# Patient Record
Sex: FEMALE | Race: BLACK OR AFRICAN AMERICAN | URBAN - METROPOLITAN AREA
[De-identification: names, ages, dates, MRNs, and addresses within clinical notes are randomized per-mention and may not be internally consistent; named-entity substitution may affect disease eponyms.]

---

## 2017-10-30 ENCOUNTER — ED HISTORICAL/CONVERTED ENCOUNTER (OUTPATIENT)
Dept: OTHER | Age: 42
End: 2017-10-30

## 2020-03-11 ENCOUNTER — OP HISTORICAL/CONVERTED ENCOUNTER (OUTPATIENT)
Dept: OTHER | Age: 45
End: 2020-03-11

## 2022-12-27 ENCOUNTER — HOSPITAL ENCOUNTER (INPATIENT)
Age: 47
LOS: 1 days | Discharge: HOME | DRG: 760 | End: 2022-12-29
Attending: STUDENT IN AN ORGANIZED HEALTH CARE EDUCATION/TRAINING PROGRAM | Admitting: INTERNAL MEDICINE

## 2022-12-27 DIAGNOSIS — D50.0 IRON DEFICIENCY ANEMIA DUE TO CHRONIC BLOOD LOSS: Primary | ICD-10-CM

## 2022-12-27 PROCEDURE — 36415 COLL VENOUS BLD VENIPUNCTURE: CPT

## 2022-12-27 PROCEDURE — 80053 COMPREHEN METABOLIC PANEL: CPT

## 2022-12-27 PROCEDURE — 99285 EMERGENCY DEPT VISIT HI MDM: CPT

## 2022-12-27 PROCEDURE — 85025 COMPLETE CBC W/AUTO DIFF WBC: CPT

## 2022-12-27 PROCEDURE — 86923 COMPATIBILITY TEST ELECTRIC: CPT

## 2022-12-27 PROCEDURE — 86900 BLOOD TYPING SEROLOGIC ABO: CPT

## 2022-12-28 ENCOUNTER — APPOINTMENT (OUTPATIENT)
Dept: ULTRASOUND IMAGING | Age: 47
DRG: 760 | End: 2022-12-28
Attending: INTERNAL MEDICINE

## 2022-12-28 PROBLEM — D64.9 ANEMIA: Status: ACTIVE | Noted: 2022-12-28

## 2022-12-28 LAB
ALBUMIN SERPL-MCNC: 3.5 G/DL (ref 3.5–5)
ALBUMIN/GLOB SERPL: 1.2 {RATIO} (ref 1.1–2.2)
ALP SERPL-CCNC: 46 U/L (ref 45–117)
ALT SERPL-CCNC: 13 U/L (ref 12–78)
ANION GAP SERPL CALC-SCNC: 11 MMOL/L (ref 5–15)
APPEARANCE UR: CLEAR
AST SERPL W P-5'-P-CCNC: 17 U/L (ref 15–37)
ATRIAL RATE: 94 BPM
BACTERIA URNS QL MICRO: NEGATIVE /HPF
BASOPHILS # BLD: 0.1 K/UL (ref 0–0.1)
BASOPHILS NFR BLD: 0 % (ref 0–1)
BILIRUB SERPL-MCNC: 1.3 MG/DL (ref 0.2–1)
BILIRUB UR QL: NEGATIVE
BUN SERPL-MCNC: 5 MG/DL (ref 6–20)
BUN/CREAT SERPL: 6 (ref 12–20)
CA-I BLD-MCNC: 8.4 MG/DL (ref 8.5–10.1)
CALCULATED P AXIS, ECG09: 41 DEGREES
CALCULATED R AXIS, ECG10: 38 DEGREES
CALCULATED T AXIS, ECG11: 49 DEGREES
CHLORIDE SERPL-SCNC: 108 MMOL/L (ref 97–108)
CO2 SERPL-SCNC: 21 MMOL/L (ref 21–32)
COLOR UR: ABNORMAL
CREAT SERPL-MCNC: 0.89 MG/DL (ref 0.55–1.02)
DIAGNOSIS, 93000: NORMAL
DIFFERENTIAL METHOD BLD: ABNORMAL
EOSINOPHIL # BLD: 0.1 K/UL (ref 0–0.4)
EOSINOPHIL NFR BLD: 0 % (ref 0–7)
ERYTHROCYTE [DISTWIDTH] IN BLOOD BY AUTOMATED COUNT: 44 % (ref 11.5–14.5)
GLOBULIN SER CALC-MCNC: 2.9 G/DL (ref 2–4)
GLUCOSE SERPL-MCNC: 115 MG/DL (ref 65–100)
GLUCOSE UR STRIP.AUTO-MCNC: NEGATIVE MG/DL
HCT VFR BLD AUTO: 11.5 % (ref 35–47)
HCT VFR BLD AUTO: 19.4 % (ref 35–47)
HCT VFR BLD AUTO: 28.4 % (ref 35–47)
HGB BLD-MCNC: 2.9 G/DL (ref 11.5–16)
HGB BLD-MCNC: 5.7 G/DL (ref 11.5–16)
HGB BLD-MCNC: 9.3 G/DL (ref 11.5–16)
HGB UR QL STRIP: ABNORMAL
IMM GRANULOCYTES # BLD AUTO: 0.3 K/UL (ref 0–0.04)
IMM GRANULOCYTES NFR BLD AUTO: 2 % (ref 0–0.5)
INR PPP: 1 (ref 0.9–1.1)
KETONES UR QL STRIP.AUTO: NEGATIVE MG/DL
LEUKOCYTE ESTERASE UR QL STRIP.AUTO: NEGATIVE
LYMPHOCYTES # BLD: 2.3 K/UL (ref 0.8–3.5)
LYMPHOCYTES NFR BLD: 15 % (ref 12–49)
MCH RBC QN AUTO: 20.9 PG (ref 26–34)
MCHC RBC AUTO-ENTMCNC: 25.2 G/DL (ref 30–36.5)
MCV RBC AUTO: 82.7 FL (ref 80–99)
MONOCYTES # BLD: 0.9 K/UL (ref 0–1)
MONOCYTES NFR BLD: 6 % (ref 5–13)
NEUTS SEG # BLD: 12.1 K/UL (ref 1.8–8)
NEUTS SEG NFR BLD: 77 % (ref 32–75)
NITRITE UR QL STRIP.AUTO: NEGATIVE
NRBC # BLD: 0.39 K/UL (ref 0–0.01)
NRBC BLD-RTO: 2.5 PER 100 WBC
P-R INTERVAL, ECG05: 160 MS
PH UR STRIP: 6 [PH] (ref 5–8)
PLATELET # BLD AUTO: 169 K/UL (ref 150–400)
POTASSIUM SERPL-SCNC: 3.6 MMOL/L (ref 3.5–5.1)
PROT SERPL-MCNC: 6.4 G/DL (ref 6.4–8.2)
PROT UR STRIP-MCNC: NEGATIVE MG/DL
PROTHROMBIN TIME: 13.6 SEC (ref 11.9–14.6)
Q-T INTERVAL, ECG07: 366 MS
QRS DURATION, ECG06: 76 MS
QTC CALCULATION (BEZET), ECG08: 457 MS
RBC # BLD AUTO: 1.39 M/UL (ref 3.8–5.2)
RBC #/AREA URNS HPF: ABNORMAL /HPF (ref 0–5)
SODIUM SERPL-SCNC: 140 MMOL/L (ref 136–145)
SP GR UR REFRACTOMETRY: 1.01 (ref 1–1.03)
UA: UC IF INDICATED,UAUC: ABNORMAL
UROBILINOGEN UR QL STRIP.AUTO: 0.1 EU/DL (ref 0.1–1)
VENTRICULAR RATE, ECG03: 94 BPM
WBC # BLD AUTO: 15.6 K/UL (ref 3.6–11)
WBC URNS QL MICRO: ABNORMAL /HPF (ref 0–4)

## 2022-12-28 PROCEDURE — 74011250636 HC RX REV CODE- 250/636: Performed by: INTERNAL MEDICINE

## 2022-12-28 PROCEDURE — 93005 ELECTROCARDIOGRAM TRACING: CPT

## 2022-12-28 PROCEDURE — 74011000250 HC RX REV CODE- 250: Performed by: INTERNAL MEDICINE

## 2022-12-28 PROCEDURE — 81001 URINALYSIS AUTO W/SCOPE: CPT

## 2022-12-28 PROCEDURE — 85025 COMPLETE CBC W/AUTO DIFF WBC: CPT

## 2022-12-28 PROCEDURE — 36415 COLL VENOUS BLD VENIPUNCTURE: CPT

## 2022-12-28 PROCEDURE — 85018 HEMOGLOBIN: CPT

## 2022-12-28 PROCEDURE — 36430 TRANSFUSION BLD/BLD COMPNT: CPT

## 2022-12-28 PROCEDURE — 76830 TRANSVAGINAL US NON-OB: CPT

## 2022-12-28 PROCEDURE — P9016 RBC LEUKOCYTES REDUCED: HCPCS

## 2022-12-28 PROCEDURE — 85610 PROTHROMBIN TIME: CPT

## 2022-12-28 PROCEDURE — 30233N1 TRANSFUSION OF NONAUTOLOGOUS RED BLOOD CELLS INTO PERIPHERAL VEIN, PERCUTANEOUS APPROACH: ICD-10-PCS | Performed by: INTERNAL MEDICINE

## 2022-12-28 PROCEDURE — 74011250637 HC RX REV CODE- 250/637: Performed by: STUDENT IN AN ORGANIZED HEALTH CARE EDUCATION/TRAINING PROGRAM

## 2022-12-28 PROCEDURE — 65270000029 HC RM PRIVATE

## 2022-12-28 RX ORDER — POLYETHYLENE GLYCOL 3350 17 G/17G
17 POWDER, FOR SOLUTION ORAL DAILY PRN
Status: DISCONTINUED | OUTPATIENT
Start: 2022-12-28 | End: 2022-12-29 | Stop reason: HOSPADM

## 2022-12-28 RX ORDER — ONDANSETRON 2 MG/ML
4 INJECTION INTRAMUSCULAR; INTRAVENOUS
Status: DISCONTINUED | OUTPATIENT
Start: 2022-12-28 | End: 2022-12-29 | Stop reason: HOSPADM

## 2022-12-28 RX ORDER — ACETAMINOPHEN 325 MG/1
650 TABLET ORAL
Status: DISCONTINUED | OUTPATIENT
Start: 2022-12-28 | End: 2022-12-29 | Stop reason: HOSPADM

## 2022-12-28 RX ORDER — ACETAMINOPHEN 325 MG/1
650 TABLET ORAL ONCE
Status: COMPLETED | OUTPATIENT
Start: 2022-12-28 | End: 2022-12-28

## 2022-12-28 RX ORDER — SODIUM CHLORIDE 0.9 % (FLUSH) 0.9 %
5-40 SYRINGE (ML) INJECTION EVERY 8 HOURS
Status: DISCONTINUED | OUTPATIENT
Start: 2022-12-28 | End: 2022-12-29 | Stop reason: HOSPADM

## 2022-12-28 RX ORDER — SODIUM CHLORIDE 9 MG/ML
250 INJECTION, SOLUTION INTRAVENOUS AS NEEDED
Status: DISCONTINUED | OUTPATIENT
Start: 2022-12-28 | End: 2022-12-29 | Stop reason: HOSPADM

## 2022-12-28 RX ORDER — ACETAMINOPHEN 650 MG/1
650 SUPPOSITORY RECTAL
Status: DISCONTINUED | OUTPATIENT
Start: 2022-12-28 | End: 2022-12-29 | Stop reason: HOSPADM

## 2022-12-28 RX ORDER — FUROSEMIDE 10 MG/ML
60 INJECTION INTRAMUSCULAR; INTRAVENOUS ONCE
Status: COMPLETED | OUTPATIENT
Start: 2022-12-28 | End: 2022-12-28

## 2022-12-28 RX ORDER — ONDANSETRON 4 MG/1
4 TABLET, ORALLY DISINTEGRATING ORAL
Status: DISCONTINUED | OUTPATIENT
Start: 2022-12-28 | End: 2022-12-29 | Stop reason: HOSPADM

## 2022-12-28 RX ORDER — SODIUM CHLORIDE 0.9 % (FLUSH) 0.9 %
5-40 SYRINGE (ML) INJECTION AS NEEDED
Status: DISCONTINUED | OUTPATIENT
Start: 2022-12-28 | End: 2022-12-29 | Stop reason: HOSPADM

## 2022-12-28 RX ADMIN — FUROSEMIDE 60 MG: 10 INJECTION, SOLUTION INTRAMUSCULAR; INTRAVENOUS at 15:39

## 2022-12-28 RX ADMIN — ACETAMINOPHEN 650 MG: 325 TABLET ORAL at 02:11

## 2022-12-28 RX ADMIN — SODIUM CHLORIDE, PRESERVATIVE FREE 10 ML: 5 INJECTION INTRAVENOUS at 21:41

## 2022-12-28 NOTE — PROGRESS NOTES
Problem: Falls - Risk of  Goal: *Absence of Falls  Description: Document Allyssa Rubione Fall Risk and appropriate interventions in the flowsheet.   Outcome: Progressing Towards Goal  Note: Fall Risk Interventions:                                Problem: Patient Education: Go to Patient Education Activity  Goal: Patient/Family Education  Outcome: Progressing Towards Goal

## 2022-12-28 NOTE — H&P
History & Physical    Primary Care Provider: Other, MD Wilmar  Source of Information: Patient self    History of Presenting Illness: Kalie Wang is a 49 y.o. female who presents with complaints of weakness and dizziness for over a week. Notes heavy menstrual bleed for over 1 week. Denies chest pain or shortness of breath. Has not follow-up with OB/GYN since personal OB/GYN left the practice. No abdominal pain nausea or vomiting. Evaluation in the ED showed a hemoglobin of 2.9 with a hematocrit of 11.5. She has received 2 units of packed red blood cells with improvement in hemoglobin to 5.7. Will be admitted for additional 2 units of packed red blood cells. Review of Systems:  A comprehensive review of systems was negative except for that written in the History of Present Illness. Past Medical History:   Diagnosis Date    Hypertension       History reviewed. No pertinent surgical history. Prior to Admission medications    Not on File     No Known Allergies   History reviewed. No pertinent family history. SOCIAL HISTORY:  Patient resides:  Independently    Assisted Living    SNF    With family care x      Smoking history:   None x   Former    Chronic      Alcohol history:   None x   Social    Chronic      Ambulates:   Independently x   w/cane    w/walker    w/wc    CODE STATUS:  DNR    Full x   Other      Objective:     Physical Exam:     Visit Vitals  BP (!) 153/96   Pulse 87   Temp 98.1 °F (36.7 °C)   Resp 17   Ht 5' 4\" (1.626 m)   Wt 73.5 kg (162 lb)   SpO2 100%   BMI 27.81 kg/m²      O2 Device: None (Room air)    General:  Alert, cooperative, no distress, appears stated age. Head:  Normocephalic, without obvious abnormality, atraumatic. Eyes:  Conjunctivae/corneas clear. PERRL, EOMs intact. Nose: Nares normal. Septum midline. Mucosa normal. No drainage or sinus tenderness.    Throat: Lips, mucosa, and tongue normal. Teeth and gums normal.   Neck: Supple, symmetrical, trachea midline, no adenopathy, thyroid: no enlargement/tenderness/nodules, no carotid bruit and no JVD. Back:   Symmetric, no curvature. ROM normal. No CVA tenderness. Lungs:   Clear to auscultation bilaterally. Chest wall:  No tenderness or deformity. Heart:  Regular rate and rhythm, S1, S2 normal, no murmur, click, rub or gallop. Abdomen:   Soft, non-tender. Bowel sounds normal. No masses,  No organomegaly. Extremities: Extremities normal, atraumatic, no cyanosis or edema. Pulses: 2+ and symmetric all extremities. Skin: Skin color, texture, turgor normal. No rashes or lesions   Neurologic: CNII-XII intact. No motor or sensory deficits. EKG:  nonspecific ST and T waves changes. Data Review:     Recent Days:  Recent Labs     12/28/22  0826 12/28/22  0032   WBC  --  15.6*   HGB 5.7* 2.9*   HCT 19.4* 11.5*   PLT  --  169     Recent Labs     12/28/22  0630 12/27/22  2325   NA  --  140   K  --  3.6   CL  --  108   CO2  --  21   GLU  --  115*   BUN  --  5*   CREA  --  0.89   CA  --  8.4*   ALB  --  3.5   TBILI  --  1.3*   ALT  --  13   INR 1.0  --      No results for input(s): PH, PCO2, PO2, HCO3, FIO2 in the last 72 hours. 24 Hour Results:  Recent Results (from the past 24 hour(s))   METABOLIC PANEL, COMPREHENSIVE    Collection Time: 12/27/22 11:25 PM   Result Value Ref Range    Sodium 140 136 - 145 mmol/L    Potassium 3.6 3.5 - 5.1 mmol/L    Chloride 108 97 - 108 mmol/L    CO2 21 21 - 32 mmol/L    Anion gap 11 5 - 15 mmol/L    Glucose 115 (H) 65 - 100 mg/dL    BUN 5 (L) 6 - 20 mg/dL    Creatinine 0.89 0.55 - 1.02 mg/dL    BUN/Creatinine ratio 6 (L) 12 - 20      eGFR >60 >60 ml/min/1.73m2    Calcium 8.4 (L) 8.5 - 10.1 mg/dL    Bilirubin, total 1.3 (H) 0.2 - 1.0 mg/dL    AST (SGOT) 17 15 - 37 U/L    ALT (SGPT) 13 12 - 78 U/L    Alk.  phosphatase 46 45 - 117 U/L    Protein, total 6.4 6.4 - 8.2 g/dL    Albumin 3.5 3.5 - 5.0 g/dL    Globulin 2.9 2.0 - 4.0 g/dL    A-G Ratio 1.2 1.1 - 2.2     TYPE & SCREEN    Collection Time: 12/27/22 11:25 PM   Result Value Ref Range    Crossmatch Expiration 12/30/2022,2359     ABO/Rh(D) B Positive     Antibody screen Negative     Unit number [id number]     Blood component type RC LR     Unit division 00     Status of unit [address] to transfuse     Crossmatch result Compatible     Unit number [id number]     Blood component type RC LR     Unit division 00     Status of unit [address] to transfuse     Crossmatch result Compatible     Unit number [id number]     Blood component type RC LR     Unit division 00     Status of unit [address] to transfuse     Crossmatch result Compatible     Unit number [id number]     Blood component type RC LR     Unit division 00     Status of unit [place] to transfuse     Crossmatch result Compatible     Unit number [id number]     Blood component type RC LR     Unit division 00     Status of unit [address] to transfuse     Crossmatch result Compatible    CBC WITH AUTOMATED DIFF    Collection Time: 12/28/22 12:32 AM   Result Value Ref Range    WBC 15.6 (H) 3.6 - 11.0 K/uL    RBC 1.39 (L) 3.80 - 5.20 M/uL    HGB 2.9 (LL) 11.5 - 16.0 g/dL    HCT 11.5 (LL) 35.0 - 47.0 %    MCV 82.7 80.0 - 99.0 FL    MCH 20.9 (L) 26.0 - 34.0 PG    MCHC 25.2 (L) 30.0 - 36.5 g/dL    RDW 44.0 (H) 11.5 - 14.5 %    PLATELET 438 602 - 101 K/uL    NRBC 2.5 (H) 0.0  WBC    ABSOLUTE NRBC 0.39 (H) 0.00 - 0.01 K/uL    NEUTROPHILS 77 (H) 32 - 75 %    LYMPHOCYTES 15 12 - 49 %    MONOCYTES 6 5 - 13 %    EOSINOPHILS 0 0 - 7 %    BASOPHILS 0 0 - 1 %    IMMATURE GRANULOCYTES 2 (H) 0 - 0.5 %    ABS. NEUTROPHILS 12.1 (H) 1.8 - 8.0 K/UL    ABS. LYMPHOCYTES 2.3 0.8 - 3.5 K/UL    ABS. MONOCYTES 0.9 0.0 - 1.0 K/UL    ABS. EOSINOPHILS 0.1 0.0 - 0.4 K/UL    ABS. BASOPHILS 0.1 0.0 - 0.1 K/UL    ABS. IMM.  GRANS. 0.3 (H) 0.00 - 0.04 K/UL    DF AUTOMATED     URINALYSIS W/ REFLEX CULTURE    Collection Time: 12/28/22  5:01 AM    Specimen: Urine   Result Value Ref Range    Color Yellow/Straw      Appearance Clear Clear      Specific gravity 1.011 1.003 - 1.030      pH (UA) 6.0 5.0 - 8.0      Protein Negative Negative mg/dL    Glucose Negative Negative mg/dL    Ketone Negative Negative mg/dL    Bilirubin Negative Negative      Blood Moderate (A) Negative      Urobilinogen 0.1 0.1 - 1.0 EU/dL    Nitrites Negative Negative      Leukocyte Esterase Negative Negative      WBC 0-4 0 - 4 /hpf    RBC 10-20 0 - 5 /hpf    Bacteria Negative Negative /hpf    UA:UC IF INDICATED Culture not indicated by UA result Culture not indicated by UA result     PROTHROMBIN TIME + INR    Collection Time: 12/28/22  6:30 AM   Result Value Ref Range    Prothrombin time 13.6 11.9 - 14.6 sec    INR 1.0 0.9 - 1.1     HGB & HCT    Collection Time: 12/28/22  8:26 AM   Result Value Ref Range    HGB 5.7 (LL) 11.5 - 16.0 g/dL    HCT 19.4 (L) 35.0 - 47.0 %         Imaging:   No orders to display         Assessment:     Anemia of acute blood loss   -Secondary to menorrhagia   -Transfuse additional 2 units of packed red blood cells to keep hemoglobin above 8.0    -Obtain transvaginal ultrasound    -Consult OB/GYN for input    -Obtain iron levels        Plan:     Admit to medical telemetry floor inpatient  Treatment plan as above  Will give IV Lasix between blood transfusions to avoid fluid overload  Avoid anticoagulants due to anemia  GI prophylaxis  She is full code  Signed By: Guru Bee MD     December 28, 2022

## 2022-12-28 NOTE — ACP (ADVANCE CARE PLANNING)
Advance Care Planning   Healthcare Decision Maker:       Primary Decision Maker: Madonna Joshi [facility] [phone]

## 2022-12-28 NOTE — ED PROVIDER NOTES
EMERGENCY DEPARTMENT HISTORY AND PHYSICAL EXAM      Date: 12/27/2022  Patient Name: Lashay Baird    History of Presenting Illness     Chief Complaint   Patient presents with    Anemia       History Provided By: Patient    HPI: Lashay Baird, 49 y.o. female with a past medical history significant No significant past medical history presents to the ED with cc of generalized weakness, dizziness, concerned about heavy menstrual cycle. Patient has a history of anemia, states over the last week her menstrual cycle has been very heavy, states she soaked multiple pads a day, has been passing clots. Over the last 2 days noted that menstrual cycle has actually lightened a little bit however today was feeling generalized weakness dizziness no loss of consciousness no chest pain mild dyspnea on exertion. Denies any abdominal pain no pain or burning on urination. Patient has never had a transfusion in the past.    There are no other complaints, changes, or physical findings at this time. PCP: Monster, MD Wilmar        Past History     Past Medical History:  Past Medical History:   Diagnosis Date    Hypertension        Past Surgical History:  History reviewed. No pertinent surgical history. Family History:  History reviewed. No pertinent family history. Social History:  Social History     Tobacco Use    Smoking status: Never    Smokeless tobacco: Never       Allergies:  No Known Allergies      Review of Systems     Review of Systems   Constitutional:  Negative for activity change, chills, fatigue and fever. HENT:  Negative for congestion and trouble swallowing. Eyes:  Negative for photophobia and visual disturbance. Respiratory:  Negative for cough, chest tightness and shortness of breath. Cardiovascular:  Negative for chest pain and palpitations. Gastrointestinal:  Negative for abdominal distention, abdominal pain, diarrhea, nausea and vomiting.    Genitourinary:  Negative for dysuria, flank pain and frequency. Musculoskeletal:  Negative for arthralgias, back pain and myalgias. Skin:  Negative for color change, pallor and rash. Neurological:  Positive for dizziness, weakness and light-headedness. Negative for tremors and headaches. Psychiatric/Behavioral:  Negative for confusion. Physical Exam     Physical Exam  Vitals and nursing note reviewed. Constitutional:       General: She is not in acute distress. Appearance: Normal appearance. She is normal weight. She is not ill-appearing. HENT:      Head: Normocephalic and atraumatic. Nose: Nose normal.      Mouth/Throat:      Mouth: Mucous membranes are moist.   Eyes:      Extraocular Movements: Extraocular movements intact. Pupils: Pupils are equal, round, and reactive to light. Cardiovascular:      Rate and Rhythm: Regular rhythm. Tachycardia present. Pulses: Normal pulses. Pulmonary:      Effort: Pulmonary effort is normal.      Breath sounds: Normal breath sounds. Abdominal:      General: Abdomen is flat. Bowel sounds are normal.      Palpations: Abdomen is soft. Tenderness: There is no abdominal tenderness. There is no guarding. Musculoskeletal:         General: No tenderness. Normal range of motion. Cervical back: Normal range of motion and neck supple. No muscular tenderness. Skin:     General: Skin is warm and dry. Neurological:      General: No focal deficit present. Mental Status: She is alert and oriented to person, place, and time. Sensory: No sensory deficit. Motor: No weakness.        Diagnostic Study Results     Labs -     Recent Results (from the past 12 hour(s))   METABOLIC PANEL, COMPREHENSIVE    Collection Time: 12/27/22 11:25 PM   Result Value Ref Range    Sodium 140 136 - 145 mmol/L    Potassium 3.6 3.5 - 5.1 mmol/L    Chloride 108 97 - 108 mmol/L    CO2 21 21 - 32 mmol/L    Anion gap 11 5 - 15 mmol/L    Glucose 115 (H) 65 - 100 mg/dL    BUN 5 (L) 6 - 20 mg/dL    Creatinine 0. 89 0.55 - 1.02 mg/dL    BUN/Creatinine ratio 6 (L) 12 - 20      eGFR >60 >60 ml/min/1.73m2    Calcium 8.4 (L) 8.5 - 10.1 mg/dL    Bilirubin, total 1.3 (H) 0.2 - 1.0 mg/dL    AST (SGOT) 17 15 - 37 U/L    ALT (SGPT) 13 12 - 78 U/L    Alk. phosphatase 46 45 - 117 U/L    Protein, total 6.4 6.4 - 8.2 g/dL    Albumin 3.5 3.5 - 5.0 g/dL    Globulin 2.9 2.0 - 4.0 g/dL    A-G Ratio 1.2 1.1 - 2.2     TYPE & SCREEN    Collection Time: 12/27/22 11:25 PM   Result Value Ref Range    Crossmatch Expiration 12/30/2022,2359     ABO/Rh(D) B Positive     Antibody screen Negative     Unit number [id number]     Blood component type [facility]     Unit division 00     Status of unit [address] to transfuse     Crossmatch result Compatible     Unit number [id number]     Blood component type [facility]     Unit division 00     Status of unit Allocated     TRANSFUSION STATUS Ok to transfuse     Crossmatch result Compatible    CBC WITH AUTOMATED DIFF    Collection Time: 12/28/22 12:32 AM   Result Value Ref Range    WBC 15.6 (H) 3.6 - 11.0 K/uL    RBC 1.39 (L) 3.80 - 5.20 M/uL    HGB 2.9 (LL) 11.5 - 16.0 g/dL    HCT 11.5 (LL) 35.0 - 47.0 %    MCV 82.7 80.0 - 99.0 FL    MCH 20.9 (L) 26.0 - 34.0 PG    MCHC 25.2 (L) 30.0 - 36.5 g/dL    RDW 44.0 (H) 11.5 - 14.5 %    PLATELET 412 164 - 692 K/uL    NRBC 2.5 (H) 0.0  WBC    ABSOLUTE NRBC 0.39 (H) 0.00 - 0.01 K/uL    NEUTROPHILS 77 (H) 32 - 75 %    LYMPHOCYTES 15 12 - 49 %    MONOCYTES 6 5 - 13 %    EOSINOPHILS 0 0 - 7 %    BASOPHILS 0 0 - 1 %    IMMATURE GRANULOCYTES 2 (H) 0 - 0.5 %    ABS. NEUTROPHILS 12.1 (H) 1.8 - 8.0 K/UL    ABS. LYMPHOCYTES 2.3 0.8 - 3.5 K/UL    ABS. MONOCYTES 0.9 0.0 - 1.0 K/UL    ABS. EOSINOPHILS 0.1 0.0 - 0.4 K/UL    ABS. BASOPHILS 0.1 0.0 - 0.1 K/UL    ABS. IMM.  GRANS. 0.3 (H) 0.00 - 0.04 K/UL    DF AUTOMATED         Radiologic Studies -   [unfilled]  CT Results  (Last 48 hours)      None          CXR Results  (Last 48 hours)      None Medical Decision Making and ED Course   I am the first provider for this patient. I reviewed the vital signs, available nursing notes, past medical history, past surgical history, family history and social history. Vital Signs-Reviewed the patient's vital signs. Patient Vitals for the past 12 hrs:   Temp Pulse Resp BP SpO2   12/28/22 0043 -- 100 18 (!) 112/58 100 %   12/27/22 2316 98.4 °F (36.9 °C) (!) 115 16 (!) 107/50 100 %       EKG interpretation: (Preliminary)      Records Reviewed: Nursing Notes    The patient presents with generalized weakness dizziness concern for anemia with a differential diagnosis of hemorrhagic anemia, dysmenorrhea, hemorrhagic cystitis,      Provider Notes (Medical Decision Making):     MDM  79-year-old female history of hypertension, presents emergency department complaining of generalized weakness dizziness and heavy menstrual cycle over the last week. Physical shows well-appearing female, in no distress, mild tachycardia noted at triage, normotensive, abdomen soft nontender nondistended, patient not actively bleeding at this time. We will draw basic lab work, type and screen for possible transfusion, UA    ED Course:   Initial assessment performed. The patients presenting problems have been discussed, and they are in agreement with the care plan formulated and outlined with them. I have encouraged them to ask questions as they arise throughout their visit. ED Course as of 12/28/22 0204   Wed Dec 28, 2022   0124 Patient's lab work resulting, CBC shows marked anemia 2.9 hematocrit 11.5.  2 units of packed red blood cells ordered, given marked anemia will admit patient. Patient is not currently having any bleeding, do not feel the patient requires urgent GYN intervention.  [PZ]   7797 I have discussed with the patient the rationale for blood component transfusion; its benefits in treating or preventing fatigue, organ damage, or death; and its risk which includes mild transfusion reactions, rare risk of blood borne infection, or more serious but rare reactions. I have discussed the alternatives to transfusion, including the risk and consequences of not receiving transfusion. The patient had an opportunity to ask questions and had agreed to proceed with transfusion of blood components. [PZ]   0146 EKG Completed at 0132, interpreted by myself at 0132, normal sinus rhythm 94, no ST segment changes, normal axis. [PZ]      ED Course User Index  [PZ] Talia Gonzalez MD         Procedures       Obed Cárdenas MD  Procedures               CRITICAL CARE NOTE :  12:31 AM  Amount of Critical Care Time: __45__(minutes)__    IMPENDING DETERIORATION -Cardiovascular  ASSOCIATED RISK FACTORS - Bleeding and Vascular Compromise  MANAGEMENT- Bedside Assessment and Supervision of Care  INTERPRETATION -  ECG, Blood Pressure, and blood work  INTERVENTIONS - hemodynamic mngmt  CASE REVIEW - Hospitalist/Intensivist  TREATMENT RESPONSE -Stable  PERFORMED BY - Self    NOTES   :  I have spent critical care time involved in lab review, consultations with specialist, family decision- making, bedside attention and documentation. This time excludes time spent in any separate billed procedures. During this entire length of time I was immediately available to the patient . Obed Cárdenas MD        Disposition       Admit      Diagnosis     Clinical Impression:   1. Iron deficiency anemia due to chronic blood loss        Attestations:    Obed Cárdenas MD    Please note that this dictation was completed with [employer], the computer voice recognition software. Quite often unanticipated grammatical, syntax, homophones, and other interpretive errors are inadvertently transcribed by the computer software. Please disregard these errors. Please excuse any errors that have escaped final proofreading. Thank you.

## 2022-12-28 NOTE — PROGRESS NOTES
1838hrs, patient complained of chest pain, cramping in nature 6/10. Parameters taken-stable with a Sinus Rhythm in telemetry box. No desaturations noted, no complaints of shortness of breath as well. Noted Furosemide 60mg IV push was given at ED before transfer to su. 1843hrs, patient verbalized pain subsided with 2/10. Also verbalized that 4 units of RBC was transfused at ED. Ordered a repeat CBC.

## 2022-12-28 NOTE — ED NOTES
TRANSFER - OUT REPORT:    Verbal report given to Virgie Pace RN (name) on Lexus Patiño  being transferred to , Rm 580(unit) for routine progression of care       Report consisted of patients Situation, Background, Assessment and   Recommendations(SBAR). Information from the following report(s) SBAR, ED Summary, MAR, and Recent Results was reviewed with the receiving nurse. Lines:   Peripheral IV 12/27/22 Right Antecubital (Active)   Site Assessment Clean, dry, & intact 12/27/22 2322   Phlebitis Assessment 0 12/27/22 2322   Infiltration Assessment 0 12/27/22 2322   Dressing Status Clean, dry, & intact 12/27/22 2322   Dressing Type Tape;Transparent 12/27/22 2322   Hub Color/Line Status Pink 12/27/22 2322   Action Taken Blood drawn 12/27/22 2322   Alcohol Cap Used Yes 12/27/22 2322        Opportunity for questions and clarification was provided.       Patient transported with:   Monitor  Tech

## 2022-12-28 NOTE — PROGRESS NOTES
Reason for Admission:  Anemia                     RUR Score:     7%                Plan for utilizing home health:   None @ this time/uses no DME. PCP: First and Last name:  Lorena Tsang     Name of Practice:    Are you a current patient: Yes/No: Yes   Approximate date of last visit: Seen over a year ago. Can you participate in a virtual visit with your PCP: Yes/Call/Has cell phone. Current Advanced Directive/Advance Care Plan: Full Code      Healthcare Decision Maker:     Primary Decision Maker: Liliam Grider [facility] - [phone]                  Transition of Care Plan:                    D/C Plan is home & friend to transport. Send Rxs to [facility] in [place] upon discharge.

## 2022-12-29 VITALS
OXYGEN SATURATION: 100 % | BODY MASS INDEX: 27.66 KG/M2 | HEIGHT: 64 IN | RESPIRATION RATE: 18 BRPM | SYSTOLIC BLOOD PRESSURE: 120 MMHG | DIASTOLIC BLOOD PRESSURE: 74 MMHG | TEMPERATURE: 98.3 F | WEIGHT: 162 LBS | HEART RATE: 92 BPM

## 2022-12-29 PROBLEM — N93.9 ABNORMAL UTERINE BLEEDING: Status: ACTIVE | Noted: 2022-12-29

## 2022-12-29 PROBLEM — N92.0 MENORRHAGIA WITH REGULAR CYCLE: Status: ACTIVE | Noted: 2022-12-29

## 2022-12-29 LAB
ANION GAP SERPL CALC-SCNC: 6 MMOL/L (ref 5–15)
BUN SERPL-MCNC: 4 MG/DL (ref 6–20)
BUN/CREAT SERPL: 5 (ref 12–20)
CA-I BLD-MCNC: 8.6 MG/DL (ref 8.5–10.1)
CHLORIDE SERPL-SCNC: 110 MMOL/L (ref 97–108)
CO2 SERPL-SCNC: 24 MMOL/L (ref 21–32)
CREAT SERPL-MCNC: 0.88 MG/DL (ref 0.55–1.02)
ERYTHROCYTE [DISTWIDTH] IN BLOOD BY AUTOMATED COUNT: 25.6 % (ref 11.5–14.5)
GLUCOSE SERPL-MCNC: 108 MG/DL (ref 65–100)
HCT VFR BLD AUTO: 29.3 % (ref 35–47)
HGB BLD-MCNC: 9.2 G/DL (ref 11.5–16)
IRON SATN MFR SERPL: 3 % (ref 20–50)
IRON SERPL-MCNC: 13 UG/DL (ref 35–150)
MCH RBC QN AUTO: 25.6 PG (ref 26–34)
MCHC RBC AUTO-ENTMCNC: 31.4 G/DL (ref 30–36.5)
MCV RBC AUTO: 81.6 FL (ref 80–99)
NRBC # BLD: 0.31 K/UL (ref 0–0.01)
NRBC BLD-RTO: 2.7 PER 100 WBC
PLATELET # BLD AUTO: 291 K/UL (ref 150–400)
POTASSIUM SERPL-SCNC: 3.4 MMOL/L (ref 3.5–5.1)
RBC # BLD AUTO: 3.59 M/UL (ref 3.8–5.2)
SODIUM SERPL-SCNC: 140 MMOL/L (ref 136–145)
TIBC SERPL-MCNC: 414 UG/DL (ref 250–450)
WBC # BLD AUTO: 11.6 K/UL (ref 3.6–11)

## 2022-12-29 PROCEDURE — 74011250636 HC RX REV CODE- 250/636: Performed by: OBSTETRICS & GYNECOLOGY

## 2022-12-29 PROCEDURE — 74011250637 HC RX REV CODE- 250/637: Performed by: LICENSED PRACTICAL NURSE

## 2022-12-29 PROCEDURE — 85027 COMPLETE CBC AUTOMATED: CPT

## 2022-12-29 PROCEDURE — 83540 ASSAY OF IRON: CPT

## 2022-12-29 PROCEDURE — 80048 BASIC METABOLIC PNL TOTAL CA: CPT

## 2022-12-29 PROCEDURE — 74011000250 HC RX REV CODE- 250: Performed by: INTERNAL MEDICINE

## 2022-12-29 PROCEDURE — 36415 COLL VENOUS BLD VENIPUNCTURE: CPT

## 2022-12-29 RX ORDER — MEDROXYPROGESTERONE ACETATE 150 MG/ML
150 INJECTION, SUSPENSION INTRAMUSCULAR
Status: COMPLETED | OUTPATIENT
Start: 2022-12-29 | End: 2022-12-29

## 2022-12-29 RX ORDER — LANOLIN ALCOHOL/MO/W.PET/CERES
325 CREAM (GRAM) TOPICAL
Qty: 30 TABLET | Refills: 0 | Status: SHIPPED | OUTPATIENT
Start: 2022-12-29 | End: 2023-01-28

## 2022-12-29 RX ORDER — POTASSIUM CHLORIDE 750 MG/1
20 TABLET, FILM COATED, EXTENDED RELEASE ORAL
Status: COMPLETED | OUTPATIENT
Start: 2022-12-29 | End: 2022-12-29

## 2022-12-29 RX ADMIN — MEDROXYPROGESTERONE ACETATE 150 MG: 150 INJECTION, SUSPENSION, EXTENDED RELEASE INTRAMUSCULAR at 15:11

## 2022-12-29 RX ADMIN — POTASSIUM CHLORIDE 20 MEQ: 750 TABLET, FILM COATED, EXTENDED RELEASE ORAL at 14:02

## 2022-12-29 RX ADMIN — SODIUM CHLORIDE, PRESERVATIVE FREE 10 ML: 5 INJECTION INTRAVENOUS at 05:51

## 2022-12-29 NOTE — CONSULTS
Consult Date: 2022    IP CONSULT TO OB GYN  Consult performed by: Yanick Grande MD  Consult ordered by: Pati Rader MD      HPI:  Patient is a 41-year-old -0-1-2, LMP 2022, admitted to medicine service due to symptomatic severe anemia in setting of heavy menses. Patient states she has had heavy menses for the past 2 years but in the last 3 months, the flow has increased significantly. She previously would have her period for 3 to 4 days and now they are lasting up to 10 days. She is still cycling monthly. But the flow was heavy enough to stop her from returning to work recently. She states the flow is as heavy as rain pouring out. She also passes palm sized clots. She finds herself confined to the bed during these episodes with decreased appetite and energy. This most recent menses started on the  and is down to scant spotting now. She denies dysmenorrhea. She reported to the ER on  due to severe dizziness and lethargy. Her presenting H/H was 2.9/11.5 on 22. She has since received 4 units pRBCS. Past Medical History:   Diagnosis Date    Anemia     Hypertension       Past Surgical History:   Procedure Laterality Date    HX DILATION AND CURETTAGE      HX TUBAL LIGATION       History reviewed. No pertinent family history.    Social History     Tobacco Use    Smoking status: Never    Smokeless tobacco: Never   Substance Use Topics    Alcohol use: Not Currently       Current Facility-Administered Medications   Medication Dose Route Frequency Provider Last Rate Last Admin    0.9% sodium chloride infusion 250 mL  250 mL IntraVENous PRN Fely Prieto MD        sodium chloride (NS) flush 5-40 mL  5-40 mL IntraVENous Q8H Pati Rader MD   10 mL at 22 0551    sodium chloride (NS) flush 5-40 mL  5-40 mL IntraVENous PRN Pati Rader MD        acetaminophen (TYLENOL) tablet 650 mg  650 mg Oral Q6H PRN Pati Rader MD        Or    acetaminophen (TYLENOL) suppository 650 mg  650 mg Rectal Q6H PRN Kaci Crisostomo MD        polyethylene glycol (MIRALAX) packet 17 g  17 g Oral DAILY PRN Kaci Crisostomo MD        ondansetron (ZOFRAN ODT) tablet 4 mg  4 mg Oral Q8H PRN Kaci Crisostomo MD        Or    ondansetron [facility]) injection 4 mg  4 mg IntraVENous Q6H PRN Kaci Crisostomo MD        0.9% sodium chloride infusion 250 mL  250 mL IntraVENous PRN Kaci Crisostomo MD            Review of Systems   Constitutional:  Negative for chills and fever. Eyes:  Negative for pain. Respiratory:  Negative for cough and shortness of breath. Cardiovascular:  Negative for chest pain. Gastrointestinal:  Negative for nausea and vomiting. Genitourinary:  Negative for dysuria. Musculoskeletal:  Negative for myalgias. Neurological:  Negative for dizziness and headaches. Hematological:  Does not bruise/bleed easily. Psychiatric/Behavioral:  Negative for behavioral problems. Objective     Vital signs for last 24 hours:  Visit Vitals  /74 (BP 1 Location: Right upper arm, BP Patient Position: At rest;Sitting)   Pulse 92   Temp 98.3 °F (36.8 °C)   Resp 18   Ht 5' 4\" (1.626 m)   Wt 162 lb (73.5 kg)   SpO2 100%   BMI 27.81 kg/m²       Intake/Output this shift:  Current Shift: No intake/output data recorded. Last 3 Shifts: 12/27 1901 - 12/29 0700  In: 1900   Out: -       Physical Exam  Constitutional:       General: She is awake. She is not in acute distress. Appearance: Normal appearance. She is not ill-appearing. Genitourinary:      Genitourinary Comments:   Female Genitalia: Bladder/Urethra: no urethral discharge or mass and normal meatus and bladder non distended. Vagina no tenderness, erythema, cystocele, rectocele, or vesicle(s) or ulcers and normal mucosa.  Cervix: no cervical motion or tenderness; scant bloody mucus at os, less than 1 cc; no active bleeding with valsalva  Uterus: normal size and shape and midline, mobile, non-tender, and no uterine prolapse. HENT:      Head: Normocephalic and atraumatic. Eyes:      Extraocular Movements: Extraocular movements intact. Pulmonary:      Effort: Pulmonary effort is normal.   Abdominal:      General: Abdomen is flat. Palpations: Abdomen is soft. Musculoskeletal:      Cervical back: Normal range of motion. Right lower leg: No edema. Left lower leg: No edema. Neurological:      Mental Status: She is alert and oriented to person, place, and time. Skin:     General: Skin is warm and dry. Psychiatric:         Mood and Affect: Mood normal.         Behavior: Behavior normal. Behavior is cooperative. Exam conducted with a chaperone present. Data Review:   Recent Results (from the past 24 hour(s))   HGB & HCT    Collection Time: 12/28/22  7:35 PM   Result Value Ref Range    HGB 9.3 (L) 11.5 - 16.0 g/dL    HCT 28.4 (L) 35.0 - 91.2 %   METABOLIC PANEL, BASIC    Collection Time: 12/29/22  5:23 AM   Result Value Ref Range    Sodium 140 136 - 145 mmol/L    Potassium 3.4 (L) 3.5 - 5.1 mmol/L    Chloride 110 (H) 97 - 108 mmol/L    CO2 24 21 - 32 mmol/L    Anion gap 6 5 - 15 mmol/L    Glucose 108 (H) 65 - 100 mg/dL    BUN 4 (L) 6 - 20 mg/dL    Creatinine 0.88 0.55 - 1.02 mg/dL    BUN/Creatinine ratio 5 (L) 12 - 20      eGFR >60 >60 ml/min/1.73m2    Calcium 8.6 8.5 - 10.1 mg/dL   CBC W/O DIFF    Collection Time: 12/29/22  5:23 AM   Result Value Ref Range    WBC 11.6 (H) 3.6 - 11.0 K/uL    RBC 3.59 (L) 3.80 - 5.20 M/uL    HGB 9.2 (L) 11.5 - 16.0 g/dL    HCT 29.3 (L) 35.0 - 47.0 %    MCV 81.6 80.0 - 99.0 FL    MCH 25.6 (L) 26.0 - 34.0 PG    MCHC 31.4 30.0 - 36.5 g/dL    RDW 25.6 (H) 11.5 - 14.5 %    PLATELET 234 571 - 579 K/uL    NRBC 2.7 (H) 0.0  WBC    ABSOLUTE NRBC 0.31 (H) 0.00 - 0.01 K/uL     Imaging 12/28/22:  Limited TRANSVAGINAL PELVIC ULTRASOUND WITH PELVIC DOPPLER. 12/28/2022 4:21 PM     INDICATION: Menorrhagia. COMPARISON: None.      TECHNIQUE: Limited grayscale, color, and Doppler ultrasound imaging of the  pelvis was performed  transvaginally. No transabdominal exam was ordered or  performed. FINDINGS:  The uterus measures 100 x 56 x 60 mm and myometrial ray shadowing suggests  small, isoechoic fibroids. The endometrium measures 12 mm is poorly visualized. Incidental note is made of cervical nabothian cysts. A cystic area in the right  adnexa measures 33 x 30 mm and may be a right ovarian cyst. No ovarian  parenchyma is shown. The left ovary is not shown. IMPRESSION  Limited evaluation as described above. Assessment        ICD-10-CM ICD-9-CM    1. Iron deficiency anemia due to chronic blood loss  D50.0 280.0        AUB- HMB    Plan    AUB-HMB and symptomatic anemia-patient status post 4 units of packed red blood cells transfusion with appropriate rise in H&H. Her symptoms have improved. Discussed abnormal uterine bleeding. Ultrasound limited but shows possible fibroids. Patient will need outpatient work-up including endometrial biopsy. She ultimately desires a hysterectomy. In the interim, we discussed potential management options to decrease risk of recurrence including but not limited to COCPs, Progesterone p.o., Depo injection (or TXA if acutely heavily bleeding). She would like Depo. Reviewed potential side effects to include but not be limited to amenorrhea, irregular menses, GI upset, mood swings, headaches, weight gain, reversible bone changes. She is to receive a dose prior to discharge.        -F/up outpatient.

## 2022-12-29 NOTE — PROGRESS NOTES
Problem: Falls - Risk of  Goal: *Absence of Falls  Description: Document [place] Fall Risk and appropriate interventions in the flowsheet.   Outcome: Progressing Towards Goal  Note: Fall Risk Interventions:                                Problem: Patient Education: Go to Patient Education Activity  Goal: Patient/Family Education  Outcome: Progressing Towards Goal

## 2022-12-29 NOTE — PROGRESS NOTES
Spiritual Care Assessment/Progress Note  [facility]      NAME: Raeann Johnson      MRN: [id number]  AGE: 49 y.o.  SEX: female  Pentecostalism Affiliation: Other   Language: English     12/29/2022     Total Time (in minutes): 35     Spiritual Assessment begun in [facility] through conversation with:         []Patient        [] Family    [] Friend(s)        Reason for Consult: Advance medical directive consult     Spiritual beliefs: (Please include comment if needed)     [] Identifies with a ed tradition:         [] Supported by a ed community:            [] Claims no spiritual orientation:           [] Seeking spiritual identity:                [] Adheres to an individual form of spirituality:           [x] Not able to assess:                           Identified resources for coping:      [] Prayer                               [] Music                  [] Guided Imagery     [x] Family/friends                 [] Pet visits     [] Devotional reading                         [] Unknown     [] Other: Career                                              Interventions offered during this visit: (See comments for more details)    Patient Interventions: Affirmation of emotions/emotional suffering, Normalization of emotional/spiritual concerns, Reframing, Catharsis/review of pertinent events in supportive environment, Advance medical directive consult           Plan of Care:     [] Support spiritual and/or cultural needs    [x] Support AMD and/or advance care planning process      [] Support grieving process   [] Coordinate Rites and/or Rituals    [] Coordination with community clergy   [] No spiritual needs identified at this time   [] Detailed Plan of Care below (See Comments)  [] Make referral to Music Therapy  [] Make referral to Pet Therapy     [] Make referral to Addiction services  [] Make referral to [facility]  [] Make referral to Spiritual Care Partner  [] No future visits requested [] Contact Spiritual Care for further referrals     Comments: I went to visit pt to discuss advanced care planning. I affirmed pt's experiences and normalized the feelings of change and self-care at this time, exploring how she planned to go about making her changes happen. We discussed ACP and AMD (see ACP note) and discussed the importance of pt's values and beliefs for herself in ACP. I will follow-up when pt needs her AMD submitted to the system.      Please [place]  in order to get in touch with  for any Spiritual Care Needs   [phone]    Signed by: Chaplain Reji

## 2022-12-29 NOTE — PROGRESS NOTES
DC order noted. Chart reviewed. Patient has no needs from CM. Discharge plan of care/case management plan validated with provider discharge order.

## 2022-12-29 NOTE — DISCHARGE SUMMARY
Hospitalist Discharge Summary     Patient ID:    Blanca Blankenship  [id number]  2 y.o.  1975    Admit date: 12/27/2022    Discharge date : 12/29/2022      Final Diagnoses: Active Problems:    Anemia (12/28/2022)        Reason for Hospitalization/Hospital Course:   79-year-old female who presents with complaints of weakness and dizziness for over a week. She reports heavy menstrual bleed for over 1 week. Denies chest pain or shortness of breath. Has not follow-up with OB/GYN since personal OB/GYN left the practice about 2 years ago. Evaluation in the ED showed a hemoglobin of 2.9 with a hematocrit of 11.5. She has received 2 units of packed red blood cells with improvement in hemoglobin to 5.7. Patient was admitted for additional 2 units of packed red blood cells. Patient responded well to blood transfusion of 4 units. Current Hgb is 9.2. Transvaginal ultrasound revealed small isoechoic fibroids an Endometrium measurement of 12 mm, and an Incidental finding of cervical nabothian cyst/Possible right ovarian cyst.     Patient reports a hx of Iron deficiency to which she used to take supplements. She was started back on supplementation and will continue it until 1/29/23. Patient to be discharged in stable condition with instructions to follow-up outpatient  with OB/GYN for further management. Discharge Medications:   Current Discharge Medication List        START taking these medications    Details   ferrous sulfate 325 mg (65 mg iron) tablet Take 1 Tablet by mouth Daily (before breakfast) for 30 days. Qty: 30 Tablet, Refills: 0  Start date: 12/29/2022, End date: 1/28/2023               Follow up Care:    1. Monster, MD [place] in 1-2 weeks.       Follow-up Information       Follow up With Specialties Details Why Contact Info    Miki Stinson MD Internal Medicine Physician Follow up in 2 day(s) As needed, If symptoms worsen [address] [facility] [address]      Garrett Hemphill MD Obstetrics & Gynecology Follow up in 2 week(s) and, As needed, If symptoms worsen [address] [place] [address]  [phone]      Other, Pili Howard MD Neurology                 Patient Follow Up Instructions: Activity: Activity as tolerated  Diet:  Regular Diet  Wound Care: None needed     Condition at Discharge:  Stable  __________________________________________________________________    Disposition  Home or Self Care  ____________________________________________________________________    Code Status:  Full Code  ___________________________________________________________________    Discharge Exam:  Patient seen and examined by me on discharge day. Pertinent Findings:  Gen:    Not in distress  Chest: Clear lungs  CVS:   Regular rhythm.   No edema  Abd:  Soft, not distended, not tender  Neuro:  Alert        CONSULTATIONS: None    Significant Diagnostic Studies:   Recent Results (from the past 24 hour(s))   HGB & HCT    Collection Time: 12/28/22  7:35 PM   Result Value Ref Range    HGB 9.3 (L) 11.5 - 16.0 g/dL    HCT 28.4 (L) 35.0 - 21.8 %   METABOLIC PANEL, BASIC    Collection Time: 12/29/22  5:23 AM   Result Value Ref Range    Sodium 140 136 - 145 mmol/L    Potassium 3.4 (L) 3.5 - 5.1 mmol/L    Chloride 110 (H) 97 - 108 mmol/L    CO2 24 21 - 32 mmol/L    Anion gap 6 5 - 15 mmol/L    Glucose 108 (H) 65 - 100 mg/dL    BUN 4 (L) 6 - 20 mg/dL    Creatinine 0.88 0.55 - 1.02 mg/dL    BUN/Creatinine ratio 5 (L) 12 - 20      eGFR >60 >60 ml/min/1.73m2    Calcium 8.6 8.5 - 10.1 mg/dL   CBC W/O DIFF    Collection Time: 12/29/22  5:23 AM   Result Value Ref Range    WBC 11.6 (H) 3.6 - 11.0 K/uL    RBC 3.59 (L) 3.80 - 5.20 M/uL    HGB 9.2 (L) 11.5 - 16.0 g/dL    HCT 29.3 (L) 35.0 - 47.0 %    MCV 81.6 80.0 - 99.0 FL    MCH 25.6 (L) 26.0 - 34.0 PG    MCHC 31.4 30.0 - 36.5 g/dL    RDW 25.6 (H) 11.5 - 14.5 %    PLATELET 700 981 - 599 K/uL    NRBC 2.7 (H) 0.0  WBC ABSOLUTE NRBC 0.31 (H) 0.00 - 0.01 K/uL     US TRANSVAGINAL   Final Result   Limited evaluation as described above.               Time spent in direct and indirect care including coordination of services: Greater than 35 minutes    Signed:  Amado Alfredo  12/29/2022  1:40 PM

## 2022-12-29 NOTE — ACP (ADVANCE CARE PLANNING)
Advance Care Planning   Advance Care Planning Inpatient Note  [facility]    Today's Date: 12/29/2022  Unit: [facility] 5 [facility]G    Received request from patient. Upon review of chart and communication with care team, patient's decision making abilities are not in question. Patient was/were present in the room during visit. Goals of ACP Conversation:  Discuss Advance Care planning documents  Facilitate a discussion related to patient's goals of care as they align with the patient's values and beliefs    Health Care Decision Makers:      Primary Decision Maker: Gordo Lester Sister - [phone]    Summary:  No Decision Maker named by patient at this time    Advance Care Planning Documents (Patient Wishes) on file:  None     Assessment:    I went to discuss [address] and an Advanced Medical Directive with patient due to in-basket request. Patient was very interested in reviewing and completing an AMD. We discussed the document and pt's values around her wishes, patient desiring to fill out the form at her leisure today. I informed pt of  availability when she is ready to review said document. I will follow as needed.      Interventions:  Encouraged ongoing ACP conversation with future decision makers and loved ones  Reviewed but did not complete ACP document    Care Preferences Communicated:  No    Outcomes/Plan:  ACP Discussion Postponed    Chaplain Kay on 12/29/2022 at 10:50 AM

## 2022-12-29 NOTE — PROGRESS NOTES
[facility] December 29, 2022       RE: Kitty Ren      To Whom It May Concern,    This is to certify that Kitty Ren may return to work and school after being hospitalized from December 27, 2022 to December 29, 2022. Please feel free to contact my office if you have any questions or concerns. Thank you for your assistance in this matter.       Sincerely,  Blane Dueñas RN

## 2022-12-29 NOTE — PROGRESS NOTES
Bedside shift change report given to Anila Watkins and Sherrill Nieto  (oncoming nurse) by Radha Enriquez (offgoing nurse).  Report included the following information SBAR, Intake/Output, MAR, and Quality Measures. '

## 2022-12-29 NOTE — PROGRESS NOTES
Problem: Falls - Risk of  Goal: *Absence of Falls  Description: Document Day Marking Fall Risk and appropriate interventions in the flowsheet.   Outcome: Progressing Towards Goal  Note: Fall Risk Interventions:                                Problem: Patient Education: Go to Patient Education Activity  Goal: Patient/Family Education  Outcome: Progressing Towards Goal     Problem: Discharge Planning  Goal: *Discharge to safe environment  Outcome: Progressing Towards Goal  Goal: *Knowledge of medication management  Outcome: Progressing Towards Goal  Goal: *Knowledge of discharge instructions  Outcome: Progressing Towards Goal     Problem: Patient Education: Go to Patient Education Activity  Goal: Patient/Family Education  Outcome: Progressing Towards Goal     Problem: Anemia Care Plan (Adult and Pediatric)  Goal: *Labs within defined limits  Outcome: Progressing Towards Goal  Goal: *Tolerates increased activity  Outcome: Progressing Towards Goal     Problem: Patient Education: Go to Patient Education Activity  Goal: Patient/Family Education  Outcome: Progressing Towards Goal

## 2022-12-29 NOTE — PROGRESS NOTES
PT states all chest pain has resolved, pt in SR with heart rate of 68. Pt HGB is 9.3, orders for blood transfusion to be held with HGB above 8. Pt also states her menstral flow is minimal at this time, it has been trending down, she feels as if it is ending.

## 2022-12-31 LAB
ABO + RH BLD: NORMAL
BLD PROD TYP BPU: NORMAL
BLOOD GROUP ANTIBODIES SERPL: NEGATIVE
BPU ID: NORMAL
CROSSMATCH RESULT,%XM: NORMAL
SPECIMEN EXP DATE BLD: NORMAL
STATUS OF UNIT,%ST: NORMAL
TRANSFUSION STATUS PATIENT QL: NORMAL
UNIT DIVISION, %UDIV: 0

## 2024-11-29 ENCOUNTER — CARE COORDINATION (OUTPATIENT)
Dept: OTHER | Facility: CLINIC | Age: 49
End: 2024-11-29

## 2024-11-29 NOTE — CARE COORDINATION
Care Transitions Note    Initial Call - Call within 2 business days of discharge: Yes    Attempted to reach patient for transitions of care follow up. Unable to reach patient.    Outreach Attempts:   HIPAA compliant voicemail left for patient.     Pt assigned from Census report. Discharge date unknown. Pt admitted on 2024 to [facility] for anemia. PA pending. Pt does have flex plan. No records in Care Everywhere or [facility].     Update 1621: Pt is still admitted IP. Discussed this ACM role and reason for call. Pt agreeable to ACM follow up. Pt is not sure when she will be discharged. Pt agreeable to call next week. Pt has staff from hospital in room and kept outreach short. Will follow up next week.     Patient: Lucinda Barajas    Patient : 1975   MRN: [id number]    Reason for Admission: Anemia  Discharge Date: 22  RURS: No data recorded  Last Discharge Facility       None            Was this an external facility discharge? Yes. Discharge Date: unknown. Facility Name: [facility]     Follow Up Appointment:   Patient does not have a follow up appointment scheduled at time of call.  UTR       Plan for follow-up call in 2-5 days     Felipa Novak RN

## 2024-12-03 ENCOUNTER — CARE COORDINATION (OUTPATIENT)
Dept: OTHER | Facility: CLINIC | Age: 49
End: 2024-12-03

## 2024-12-03 NOTE — CARE COORDINATION
Care Transitions Note    Follow Up Call     Attempted to reach patient for transitions of care follow up. Unable to reach patient.    Outreach Attempts:   Unable to leave message. Voicemail full.     Discharge date unknown and last outreach pt still admitted. No records in Care Everywhere or [facility].     Patient: Lucinda Barajas    Patient : 1975   MRN: [id number]    Reason for Admission: Anemia   Discharge Date: 22  RURS: No data recorded  Last Discharge Facility       None            Was this an external facility discharge? Yes. Discharge Date: unknown . Facility Name:  [facility]     Follow Up Appointment:   Patient does not have a follow up appointment scheduled at time of call.  UTR       Plan for follow-up on next business day.      Felipa Novak RN

## 2024-12-04 ENCOUNTER — CARE COORDINATION (OUTPATIENT)
Dept: OTHER | Facility: CLINIC | Age: 49
End: 2024-12-04

## 2024-12-04 NOTE — CARE COORDINATION
Care Transitions Note    Initial Call - Call within 2 business days of discharge: Yes    Attempted to reach patient for transitions of care follow up. Unable to reach patient.    Outreach Attempts:   Unable to reach letter mailed to address on file.   Unable to leave message. Voicemail full     Incoming call received from pt after hours yesterday with no message.     Patient: Lucinda Barajas    Patient : 1975   MRN: [id number]    Reason for Admission: Anemia   Discharge Date: 22  RURS: No data recorded  Last Discharge Facility       None            Was this an external facility discharge? Yes. Discharge Date: unknown. Facility Name: [facility]     Follow Up Appointment:   Patient does not have a follow up appointment scheduled at time of call.  UTR      Plan for follow-up call in 6-10 days     Felipa Novak RN

## 2024-12-11 ENCOUNTER — CARE COORDINATION (OUTPATIENT)
Dept: OTHER | Facility: CLINIC | Age: 49
End: 2024-12-11

## 2024-12-11 NOTE — CARE COORDINATION
Care Transitions Note    Final Call     Attempted to reach patient for transitions of care follow up.  Unable to reach patient.      Outreach Attempts:   Multiple attempts to contact patient at phone numbers on file.   Unable to reach letter mailed to address on file.   Unable to leave message.  [facility]    Patient closed (unable to reach patient) from the Care Transitions program on 12/11/2024.  Patient/family  UTR .      Handoff:   Patient was not referred to the ACM team due to unable to contact patient.        Upcoming Appointments:        Felipa Novak RN

## 2024-12-19 ENCOUNTER — CARE COORDINATION (OUTPATIENT)
Dept: OTHER | Facility: CLINIC | Age: 49
End: 2024-12-19

## 2024-12-19 NOTE — CARE COORDINATION
Ambulatory Care Coordination Note     12/19/2024 5:09 PM        ACM: Felipa Novak RN     Care Summary Note: Incoming call received from pt. Pt reports in messaged that the was admitted IP for about 5 days. Pt following up with providers weekly. Pt reports she has a lot of going on with health but managing with OP providers. Requested call back but UTR when returned call. Left HIPAA compliant message. Will remain signed off unless pt returns call.